# Patient Record
Sex: FEMALE | Race: WHITE
[De-identification: names, ages, dates, MRNs, and addresses within clinical notes are randomized per-mention and may not be internally consistent; named-entity substitution may affect disease eponyms.]

---

## 2022-07-15 ENCOUNTER — HOSPITAL ENCOUNTER (OUTPATIENT)
Dept: HOSPITAL 46 - DS | Age: 67
Discharge: HOME | End: 2022-07-15
Attending: SURGERY
Payer: COMMERCIAL

## 2022-07-15 VITALS — HEIGHT: 64 IN | WEIGHT: 199.41 LBS | BODY MASS INDEX: 34.04 KG/M2

## 2022-07-15 DIAGNOSIS — E03.9: ICD-10-CM

## 2022-07-15 DIAGNOSIS — E11.9: ICD-10-CM

## 2022-07-15 DIAGNOSIS — L72.0: Primary | ICD-10-CM

## 2022-07-15 DIAGNOSIS — I10: ICD-10-CM

## 2022-07-15 DIAGNOSIS — Z88.1: ICD-10-CM

## 2022-07-15 DIAGNOSIS — E55.9: ICD-10-CM

## 2022-07-15 DIAGNOSIS — Z88.8: ICD-10-CM

## 2022-07-15 DIAGNOSIS — E78.5: ICD-10-CM

## 2022-07-15 DIAGNOSIS — Z79.84: ICD-10-CM

## 2022-07-15 PROCEDURE — 0HB4XZZ EXCISION OF NECK SKIN, EXTERNAL APPROACH: ICD-10-PCS | Performed by: SURGERY

## 2022-07-15 NOTE — NUR
1150-PATIENT LAYING IN BED WITH EYES CLOSED. RESP RATE EVEN AND UNLABORED.
RATES PAIN 5/10, DENIES NASUEA. DRESSING IS CLEAN, DRY, AND INTACT. ICE PACK
IN PLACE. PATIENT TOLERATED CRACKERS AND WATER. CALL LIGHT WITHIN REACH.
1153-PAIN MEDICATION GIVEN PER EMAR. IMANI HUGGER TURNED ON AND WARM BLANKET
PROVIDED.

## 2022-07-15 NOTE — NUR
07/15/22 1019 Sheets,Alysha
1009 PT ARRIVED TO PACU WITH ORAL AIRWAY IN PLACE AND RESP EVEN AND
UNLABORED. VSS. . PT NONAROUSABLE.

## 2022-07-15 NOTE — NUR
1050 PATIENT BACK FROM PACU. PATIENT REPORT RECIEVED FROM NYA DIAZ. PATIENT IS
DROWSY. PATIENT IS ON 2 LITERS VIA NASAL CANNULA. BREAHTING EQUAL AND
UNLABORED. PATIENT SURGICAL SITE IS CLEAN, DRY AND INTACT. ICE APPLIED AT
SITE. IVF INFUSING.
1100 OXYGEN TITRATED DOWN TO ROOM AIR. BREATHING EQUAL AND UNLABORED. OXYGEN
SATURATIONS ABOVE 95%.
1130 PATIENT UP TO THE BATHROOM. ABLE TO VOID CLEAR AND YELLOW URINE. PATIENT
DRINKING WATER AND EATING CRACKERS. CALL LIGHT WITHIN REACH NO FUTHER NEEDS.
NO QUESTIONS AT THIS TIME.

## 2022-07-16 NOTE — OR
[facility]
                                    [address], [place]  [address]
_________________________________________________________________________________________
                                                                 Signed   
 
 
DATE OF OPERATION:
07/15/2022
 
SURGEON:
Meet Barber MD
 
PREOPERATIVE DIAGNOSIS:
Posterior midline cervical epidermal cyst.
 
POSTOPERATIVE DIAGNOSIS:
Posterior midline cervical epidermal cyst.
 
PROCEDURE:
Excision of posterior midline cervical epidermal cyst.
 
ESTIMATED BLOOD LOSS:
None.
 
INDICATIONS:
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  She has had trouble with a recurring
epidermal cyst in the posterior cervical midline, it is where the base of the neck meets
the upper part of her back.  She decided she wanted to come and have it removed.  It has
been incised and drained at least twice before.  It is very easy to identify.  She is
very aware of an epidermal cyst and that the entire cyst and cyst wall needs to be
excised, otherwise, it tend to recur.  Based on its location, we decided to use a
transverse incision right in the crease of her neck to remove the cyst.  She understands
there is risk including, but not limited to bleeding, infection, scarring, change in
contour of the skin as well as recurrent cyst in the same or other locations.  She
understands expected intraop and postop course.  She has expressed understanding and
would like to proceed. 
 
DESCRIPTION OF PROCEDURE:
I met with Miri in our preop area.  We both could easily identify the indurated area
in her skin in the posterior midline of her neck, where it meets her upper back.  We
marked that area appropriately.  After this, Miri was taken into the operating room
and placed in the right lateral decubitus position with appropriate padding and
monitoring under general LMA anesthesia.  She was given preoperative antibiotics along
with subcutaneous heparin.  SCDs were utilized.  She was then prepped and draped in the
usual sterile fashion.  We then made a transverse elliptical incision 4 mm wide to
involve the pit in the surrounding skin.  This was carried down around the lesion with
the help of the cautery.  The surrounding fat was quite supple.  The entire lesion was
passed off the field en bloc.  We injected local anesthetic into the wound.  The wound
 
    Electronically Signed By: MEET BARBER MD  07/16/22 0832
_________________________________________________________________________________________
PATIENT NAME:     MIRI LEACH                  
MEDICAL RECORD #: [id number]            OPERATIVE REPORT              
          ACCT #: [id number]  
DATE OF BIRTH:   [birth year]            REPORT #: [id number]      
PHYSICIAN:        MEET BARBER MD             
PCP:              ZOË OLIVIA MD                
REPORT IS CONFIDENTIAL AND NOT TO BE RELEASED WITHOUT AUTHORIZATION
 
 
                                  [facility]
                                    [address], [place]  [address]
_________________________________________________________________________________________
                                                                 Signed   
 
 
was irrigated and suctioned out until clear.  We closed the dermis with interrupted 3-0
subcuticular Monocryl sutures.  The skin edges were reapproximated with a running 5-0
fast absorbing plain gut suture.  Dry gauze and tape were then applied.  Miri was
rotated in the 
supine position, weaned from anesthesia, extubated in the OR, and taken into recovery
room in stable condition. 
 
 
 
            ________________________________________
            Meet Barber MD 
 
 
ALB/MODL
Job #:  [id number]
DD:  07/15/2022 10:14:51
DT:  07/15/2022 16:26:52
 
cc:            Meet Barber MD 
 
               Patient Chart 
 
               Zoë Olivia MD
 
 
Copies:  MEET BARBER MD
~
 
 
 
 
 
 
 
 
 
 
 
 
 
 
 
 
    Electronically Signed By: MEET BARBER MD  07/16/22 0832
_________________________________________________________________________________________
PATIENT NAME:     MIRI LEACH                  
MEDICAL RECORD #: [id number]            OPERATIVE REPORT              
          ACCT #: [id number]  
DATE OF BIRTH:   [birth year]            REPORT #: [id number]      
PHYSICIAN:        MEET BARBER MD             
PCP:              ZOË OLIVIA MD                
REPORT IS CONFIDENTIAL AND NOT TO BE RELEASED WITHOUT AUTHORIZATION

## 2022-07-21 NOTE — PATH
[facility]
                                    [address], [place]  [address]
_________________________________________________________________________________________
                                                                 Signed   
 
 
 
SPECIMEN(S): A POSTERIOR CERVICAL CYST
 
SPECIMEN SOURCE:
A. POSTERIOR CERVICAL CYST
 
CLINICAL HISTORY:
Epidermal cyst.  Excision posterior midline cervical cyst.
 
FINAL PATHOLOGIC DIAGNOSIS:
Skin, posterior neck, excision:
-  Deep dermal/subcutaneous scar, hemosiderin deposition, chronic inflammation 
and foreign body type giant cell reaction associated with non-polarizable 
material. 
-  No cyst identified.
 
COMMENT:
The specimen was entirely submitted.
NAL:cml:C2NR
 
MICROSCOPIC EXAMINATION:
Histologic sections of all submitted blocks are examined by light microscopy.  
These findings, together with the gross examination, support the pathologic 
diagnosis.  Adjacent to the scar in the deep 
dermis/subcutaneous tissue is a focus of dense fibrous tissue with bland 
spindle cells. To rule out a neural neoplasm, an S100 immunohistochemical stain 
(with appropriately staining controls) was 
performed and is negative. This is interpreted as reactive fibrosis, part of 
the scar. 
 
GROSS DESCRIPTION:
The specimen, labeled "PG, A," and designated on the requisition "posterior 
cervical cyst, epidermal cyst," is received in formalin and consists of an 
unoriented, yellow, lobulated, 2.7 x 2.7 x 1.5 cm 
tissue piece with overlying, tan, markedly wrinkled, 2.7 x 0.6 cm in segment is 
without a discrete mass/lesion.  The resection margin is inked blue, and the 
specimen is cross-sectioned to reveal 
yellow and tan-white fibrous and fibroadipose tissue without a discrete 
mass/lesion.  Representative sections are submitted in one cassette (A1). 
AI (under the direct supervision of a pathologist)
The remainder of the specimen is submitted in cassettes A2-A6 per Dr. Christensen's to 
request. FB 
 
                                                                                    
_________________________________________________________________________________________
PATIENT NAME:     GONZALO LEACH                  
MEDICAL RECORD #: [id number]            PATHOLOGY                     
          ACCT #: [id number]       ACCESSION #: [id number]     
DATE OF BIRTH:   [birth year]            REPORT #: [id number]       
PHYSICIAN:        JORY SIU              
PCP:              MAGDIEL CRUZ MD                
REPORT IS CONFIDENTIAL AND NOT TO BE RELEASED WITHOUT AUTHORIZATION
 
 
                                  [facility]
                                    [address] [place] [address]
_________________________________________________________________________________________
                                                                 Signed   
 
 
The Gross Description was prepared using a voice recognition system. The report 
was reviewed for accuracy; however, sound-alike word errors, addition and/or 
deletions may occur. If there is any 
question about this report, please contact Client Services.
 
PERFORMING LABORATORY:
The technical component was performed by [facility] [address] (CLIA# [id number]). Professional interpretation [facility] 
[facility], [address] [address] (CLIA# 
[id number]). 
 
Diagnostician:  Melissa Christensen MD
Pathologist
Electronically Signed 07/21/2022
 
 
Copies:                                
~
 
 
 
 
 
 
 
 
 
 
 
 
 
 
 
 
 
 
 
 
 
 
 
 
                                                                                    
_________________________________________________________________________________________
PATIENT NAME:     GONZALO LEACH                  
MEDICAL RECORD #: [id number]            PATHOLOGY                     
          ACCT #: [id number]       ACCESSION #: [id number]     
DATE OF BIRTH:   [birth year]            REPORT #: [id number]       
PHYSICIAN:        [facility]              
PCP:              MAGDIEL CRUZ MD                
REPORT IS CONFIDENTIAL AND NOT TO BE RELEASED WITHOUT AUTHORIZATION

## 2024-09-24 NOTE — NUR
09/24/24 Shania7 CARY BENITEZ
1034 PT ARRIVED TO PACU AWAKE AND TALKING. PT REPORTS NO PAIN. PT ON
2L OXYGEN VIA NASAL CANULLA. REPORT TAKEN FROM NADINE FAY.
1045 PT SITTING IN SEMI FOWLERS, SIPPING WATER.
1054 PT BLOOD GLUCOSE 146.
1055 REMOVED PT OXYGEN, PT SATURATION STAYING ABOVE 96% ON RA

## 2024-09-25 NOTE — OR
[facility]
                                    [address] [place], [place]  [address]
_________________________________________________________________________________________
                                                                 Signed   
 
 
DATE OF OPERATION:
09/24/2024
 
SURGEON:
Meet Barber MD
 
PREOPERATIVE DIAGNOSES:
1. Cologuard positive stool 2023.
2. Hemorrhoids.
3. Lifelong irritable bowel syndrome with diarrhea and some constipation.
4. Chronic lymphocytic colitis and eosinophilia.
5. Melanosis coli.
 
POSTOPERATIVE DIAGNOSES:
1. 4 mm cecal polyp.
2. 4 mm polyp at 30 cm in sigmoid colon.
 
PROCEDURE:
Colonoscopy with hot biopsy and cold biopsies of the transverse colon, left colon and
sigmoid colon. 
 
ESTIMATED BLOOD LOSS:
None.
 
INDICATIONS:
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
followup colonoscopy.  She happened to work with a couple of our primary care providers
during her career.  Her Cologuard test came back positive in 2023.  Prior to that, it
was negative.  She thinks this is from her hemorrhoids.  She is pretty certain Dr. Jerald Zeng helped her with the colonoscopy somewhere before 2005.  To her
knowledge, it was negative.  Dr. Hubbard helped her with her cholecystectomy in 2005 for
her gallstones and upper abdominal symptoms.  Her symptoms continued, so she had an
upper endoscopy that same year with Dr. Hubbard using 5 mg of Versed and 100 mcg of
fentanyl.  The biopsies were negative.  She was continued to have symptoms and so Dr. Hubbard helped her in that same year at the age of 50 with her colonoscopy.  She used 10
mg of Versed and 150 mcg of fentanyl.  She was having left upper quadrant abdominal pain
and some irritable bowel syndrome mainly with diarrhea, but some constipation.  The
biopsies demonstrated chronic lymphocytic colitis and some eosinophilia.  There was some
melanosis coli as well.  She cannot recall taking any steroids for the colitis.  She
said her symptoms have been lifelong.  She said it is mostly diarrhea.  She has no
family history of colon cancer or [id number].  In the office, I had given her a pamphlet on
colonoscopy.  We reviewed the nature of the test.  There is risk including, but not
 
    Electronically Signed By: MEET BARBER MD  09/25/24 0708
_________________________________________________________________________________________
PATIENT NAME:     MIRI LEACH                  
MEDICAL RECORD #: [id number]            OPERATIVE REPORT              
          ACCT #: [id number]  
DATE OF BIRTH:   [birth year]            REPORT #: [id number]      
PHYSICIAN:        MEET BARBER MD             
PCP:              ZOË CRUZ MD                
REPORT IS CONFIDENTIAL AND NOT TO BE RELEASED WITHOUT AUTHORIZATION
 
 
                                  [facility]
                                    [address] [place] [address]  [address]
_________________________________________________________________________________________
                                                                 Signed   
 
 
limited to gas bloating, crampy abdominal pain, bleeding, perforation requiring surgery,
and missed diagnosis.  We also reviewed the written instructions for the bowel prep line
by line.  She also understands the need for IV conscious sedation.  She said her 
would be available to take her home afterwards.  She had expressed understanding and
wished to proceed. 
 
DESCRIPTION OF PROCEDURE:
Miri was taken into the endoscopy suite and placed in the left lateral decubitus
position.  She was given 6 mg of Versed and 150 mcg of fentanyl to cover the case.  A
digital rectal exam was performed.  This was unremarkable.  She had no external
hemorrhoids.  There were no masses noted.  She had good sphincter tone.  The adult
colonoscope was introduced and advanced under direct visualization of the camera.  It
took a little extra sedation and some abdominal compression in order to get the camera
down into the cecum itself.  Her prep was quite good.  We could easily see the
appendiceal orifice and the ileocecal valve.  We had taken pictures throughout for
photodocumentation.  The two polyps mentioned above were easily removed with the hot
biopsy forceps.  We took several random biopsies because of the lifelong history of
diarrhea.  There was no diverticulosis.  Upon retroflexion of the scope in the rectum,
there was no additional pathology noted above the anal canal.  After this, the gas was
suctioned out and the colonoscope removed.  Miri tolerated the procedure quite well. 
 
RECOMMENDATIONS:
I will see Miri back in my office in 7 to 14 days to review her results.
 
 
 
            ________________________________________
            Meet Barber MD 
 
 
ALB/HEATHERL
Job #:  [id number]
DD:  09/24/2024 10:44:09
DT:  09/24/2024 11:56:27
 
cc:            MD Zoë Arora MD
 
 
 
 
 
    Electronically Signed By: MEET BARBER MD  09/25/24 0708
_________________________________________________________________________________________
PATIENT NAME:     MIRI LEACH                  
MEDICAL RECORD #: [id number]            OPERATIVE REPORT              
          ACCT #: [id number]  
DATE OF BIRTH:   [birth year]            REPORT #: [id number]      
PHYSICIAN:        MEET BARBER MD             
PCP:              ZOË CRUZ MD                
REPORT IS CONFIDENTIAL AND NOT TO BE RELEASED WITHOUT AUTHORIZATION
 
 
                                  [address]  [address]
_________________________________________________________________________________________
                                                                 Signed   
 
 
Copies:  MEET BARBER MD
~
 
 
 
 
 
 
 
 
 
 
 
 
 
 
 
 
 
 
 
 
 
 
 
 
 
 
 
 
 
 
 
 
 
 
 
 
 
 
 
 
 
    Electronically Signed By: MEET BARBER MD  09/25/24 0708
_________________________________________________________________________________________
PATIENT NAME:     MIRI LEACH [place]                  
MEDICAL RECORD #: [id number]            OPERATIVE REPORT              
          ACCT #: [id number]  
DATE OF BIRTH:   [birth year]            REPORT #: [id number]      
PHYSICIAN:        MEET BARBER MD             
PCP:              ZOË CRUZ MD                
REPORT IS CONFIDENTIAL AND NOT TO BE RELEASED WITHOUT AUTHORIZATION

## 2024-09-26 NOTE — PATH
[facility]
                                    [address] [facility]
                                  [place], [place]  [address]
_________________________________________________________________________________________
                                                                 Signed   
 
 
 
SPECIMEN(S): A CECUM POLYP
SPECIMEN(S): B TRANSVERSE BIOPSY
SPECIMEN(S): C DESCENDING BIOPSY
SPECIMEN(S): D SIGMOID POLYP, 30 CM
SPECIMEN(S): E SIGMOID BIOPSY
 
SPECIMEN SOURCE:
A. CECUM POLYP
B. TRANSVERSE BIOPSY
C. DESCENDING BIOPSY
D. SIGMOID POLYP, 30 CM
E. SIGMOID BIOPSY
 
CLINICAL HISTORY:
Constipation, diarrhea, positive Cologuard, colitis, melanosis coli
 
FINAL PATHOLOGIC DIAGNOSIS:
A.   Cecum polyp:
-    Tubular adenoma (one fragment).
B.   Transverse biopsy:
-    Tubular adenoma (one fragment).
C.   Descending biopsy:
-    Benign colonic mucosa, negative for specific diagnostic abnormality.
D.   Sigmoid polyp at 30 cm:
-    Hyperplastic polyp (one fragment).
E.   Sigmoid biopsy:
-    Benign colonic mucosa, negative for specific diagnostic abnormality.
   JVR:llc
 
MICROSCOPIC EXAMINATION:
Histologic sections of all submitted blocks are examined by light microscopy.  
These findings, together with the gross examination, support the pathologic 
diagnosis. 
 
GROSS DESCRIPTION:
A. The specimen, labeled and designated "Olvin cecum polyp," is received in 
formalin and consists of one tan soft tissue fragment, 0.3 cm. Entirely 
submitted in (A1). 
B. The specimen, labeled and designated "Olvin, transverse biopsy," is 
received in formalin and consists of two tan soft tissue fragments, ranging 
from 0.1-0.3 cm. Entirely submitted in (B1). 
 
                                                                                    
_________________________________________________________________________________________
PATIENT NAME:     GONZALO LEACH                  
MEDICAL RECORD #: [id number]            PATHOLOGY                     
          ACCT #: [id number]       ACCESSION #: [id number]     
DATE OF BIRTH:   [birth year]            REPORT #: [id number]       
PHYSICIAN:        [facility]              
PCP:              MAGDIEL CRUZ MD                
REPORT IS CONFIDENTIAL AND NOT TO BE RELEASED WITHOUT AUTHORIZATION
 
 
                                  [facility]
                                    [address] [place], [place]  [address]
_________________________________________________________________________________________
                                                                 Signed   
 
 
C. The specimen, labeled and designated "Olvin, descending biopsy," is 
received in formalin and consists of one tan soft tissue fragment, 0.7 cm. 
Entirely submitted in (C1). 
D. The specimen, labeled and designated "Olvin, sigmoid polyp, 30 cm," is 
received in formalin and consists of one tan soft tissue fragment, 0.2 cm. 
Entirely submitted in (D1). 
E. The specimen, labeled and designated "Olvin, sigmoid biopsy," is received 
in formalin and consists of one tan soft tissue fragment, 0.3 cm. Entirely 
submitted in (E1). 
VB  (under the direct supervision of a pathologist)
 
The Gross Description was prepared using a voice recognition system. The report 
was reviewed for accuracy; however, sound-alike word errors, addition and/or 
deletions may occur. If there is any 
question about this report, please contact Client Services.
 
PERFORMING LABORATORY:
Technical component was performed by [facility], [address] [place] [address] (CLIA# [id number]).  Professional interpretation was 
performed by [facility], 
[address] [place] [id number] (CLIA#: [id number]).
 
Diagnostician:  Ra Doty MD
Pathologist
Electronically Signed 09/26/2024
 
 
Copies:                                
~
 
 
 
 
 
 
 
 
 
 
 
 
 
 
                                                                                    
_________________________________________________________________________________________
PATIENT NAME:     GONZALO LEACH                  
MEDICAL RECORD #: [id number]            PATHOLOGY                     
          ACCT #: [id number]       ACCESSION #: [id number]     
DATE OF BIRTH:   [birth year]            REPORT #: [id number]       
PHYSICIAN:        [facility]              
PCP:              MAGDIEL CRUZ MD                
REPORT IS CONFIDENTIAL AND NOT TO BE RELEASED WITHOUT AUTHORIZATION